# Patient Record
Sex: MALE | Race: WHITE | NOT HISPANIC OR LATINO | Employment: FULL TIME | URBAN - METROPOLITAN AREA
[De-identification: names, ages, dates, MRNs, and addresses within clinical notes are randomized per-mention and may not be internally consistent; named-entity substitution may affect disease eponyms.]

---

## 2024-01-16 ENCOUNTER — OFFICE VISIT (OUTPATIENT)
Dept: GASTROENTEROLOGY | Facility: CLINIC | Age: 59
End: 2024-01-16
Payer: COMMERCIAL

## 2024-01-16 VITALS — HEART RATE: 61 BPM | HEIGHT: 68 IN | WEIGHT: 237 LBS | BODY MASS INDEX: 35.92 KG/M2

## 2024-01-16 DIAGNOSIS — K21.9 GASTROESOPHAGEAL REFLUX DISEASE, UNSPECIFIED WHETHER ESOPHAGITIS PRESENT: Primary | ICD-10-CM

## 2024-01-16 DIAGNOSIS — K30 FUNCTIONAL DYSPEPSIA: ICD-10-CM

## 2024-01-16 DIAGNOSIS — R14.0 BLOATING: ICD-10-CM

## 2024-01-16 PROCEDURE — 99203 OFFICE O/P NEW LOW 30 MIN: CPT | Performed by: INTERNAL MEDICINE

## 2024-01-16 PROCEDURE — 1036F TOBACCO NON-USER: CPT | Performed by: INTERNAL MEDICINE

## 2024-01-16 NOTE — PROGRESS NOTES
REASON FOR VISIT: Discuss abdominal symptoms    HPI:  Chandu Allred is a 58 y.o. male with a past medical history of GERD being evaluated in the office for dyspepsia and bloating symptoms.  States over the past 6 months has had more fullness in the upper abdomen postprandially.  Feels full quickly.  Has history of GERD in the past and told on upper endoscopy more than 20 years ago that he had reflux damage and ulcers.  Was on PPI therapy chronically for some time back then but has not been on it more recently at all.  He has gained 30 pounds in the past 3 years gradually.  No rectal bleeding or melena.  No dysphagia or odynophagia.          PRIOR ENDOSCOPY    PAST MEDICAL HISTORY  Past Medical History:   Diagnosis Date    Other conditions influencing health status     Peptic Ulcer    Personal history of diseases of the skin and subcutaneous tissue 06/15/2018    History of skin pruritus    Personal history of diseases of the skin and subcutaneous tissue 06/15/2018    History of dermatitis    Personal history of other diseases of the nervous system and sense organs 04/25/2019    History of ear pain    Personal history of other diseases of the respiratory system 07/25/2015    History of acute pharyngitis    Personal history of other diseases of the respiratory system 11/06/2013    History of acute bronchitis    Personal history of other diseases of the respiratory system 08/28/2013    History of acute sinusitis       PAST SURGICAL HISTORY  No past surgical history on file.    FAMILY HISTORY  No family history on file.    SOCIAL HISTORY  Social History     Tobacco Use    Smoking status: Former     Types: Cigarettes     Passive exposure: Past    Smokeless tobacco: Never   Substance Use Topics    Alcohol use: Not on file       REVIEW OF SYSTEMS  CONSTITUTIONAL: negative for fever, chills, fatigue, or unintentional weight loss,   HEENT: negative for icteric sclera, eye pain/redness, or changes in  "vision/hearing  RESPIRATORY: negative for cough, hemoptysis, wheezing, orthopnea, or dyspnea on exertion  CARDIOVASCULAR: negative for chest pain, palpitations, or syncope   GASTROINTESTINAL: as noted per HPI  GENITOURINARY: negative for dysuria, polyuria, incontinence, or hematuria  MUSCULOSKELETAL: negative for arthralgia, myalgia, or joint swelling/stiffness   INTEGUMENTARY/SKIN: negative jaundice, rash, or skin lesion  HEMATOLOGIC/LYMPHATIC: negative for prolonged bleeding, easy bruising, or swollen lymph nodes  ENDOCRINE: negative for cold/heat intolerance, polydipsia, polyuria, or goiter  NEUROLOGIC: negative for headaches, dizziness, tremor, or gait abnormality  PSYCHIATRIC: negative for anxiety, depression, personality changes, or sleep disturbances      A 10 point review of systems was completed and was otherwise negative.    ALLERGIES  No Known Allergies    MEDICATIONS  No current outpatient medications on file.     No current facility-administered medications for this visit.       VITALS  Pulse 61   Ht 1.715 m (5' 7.5\")   Wt 108 kg (237 lb)   BMI 36.57 kg/m²      PHYSICAL EXAM  Alert and oriented in no acute distress  Abdomen soft, no focal tenderness to palpation, no hepatosplenomegaly    ASSESSMENT/ PLAN  Patient with dyspepsia and bloating.  History of GERD and esophagitis in the past.  Also possible peptic ulcers in the past.  Given his ongoing symptoms recommended upper endoscopy to rule out underlying reflux esophagitis and screen for Lin's esophagus and also rule out underlying upper GI neoplasia.  Not losing weight unintentionally at this point.  Discussed possibility of functional dyspepsia as well.  He is due for screening colonoscopy and has had Cologuard test approximately 4 years ago.  He would like to pursue at the same time which we will arrange.        Signature: Isa Solitraio MD    Date: 1/16/2024  Time: 3:52 PM    "

## 2024-01-17 DIAGNOSIS — K21.9 GASTROESOPHAGEAL REFLUX DISEASE, UNSPECIFIED WHETHER ESOPHAGITIS PRESENT: ICD-10-CM

## 2024-01-17 DIAGNOSIS — K30 FUNCTIONAL DYSPEPSIA: ICD-10-CM

## 2024-01-17 RX ORDER — HYDROGEN PEROXIDE 3 %
20 SOLUTION, NON-ORAL MISCELLANEOUS
Qty: 90 CAPSULE | Refills: 1 | Status: SHIPPED | OUTPATIENT
Start: 2024-01-17 | End: 2024-02-06 | Stop reason: WASHOUT

## 2024-01-18 DIAGNOSIS — K21.9 GASTROESOPHAGEAL REFLUX DISEASE, UNSPECIFIED WHETHER ESOPHAGITIS PRESENT: Primary | ICD-10-CM

## 2024-01-18 RX ORDER — OMEPRAZOLE 20 MG/1
20 TABLET, DELAYED RELEASE ORAL DAILY
Qty: 90 TABLET | Refills: 1 | Status: SHIPPED | OUTPATIENT
Start: 2024-01-18 | End: 2024-02-06 | Stop reason: WASHOUT

## 2024-01-19 DIAGNOSIS — K30 FUNCTIONAL DYSPEPSIA: ICD-10-CM

## 2024-01-19 DIAGNOSIS — K21.9 GASTROESOPHAGEAL REFLUX DISEASE, UNSPECIFIED WHETHER ESOPHAGITIS PRESENT: ICD-10-CM

## 2024-01-19 RX ORDER — ESOMEPRAZOLE MAGNESIUM 40 MG/1
40 CAPSULE, DELAYED RELEASE ORAL
Qty: 90 CAPSULE | Refills: 1 | Status: SHIPPED | OUTPATIENT
Start: 2024-01-19

## 2024-02-06 ENCOUNTER — LAB (OUTPATIENT)
Dept: LAB | Facility: LAB | Age: 59
End: 2024-02-06
Payer: COMMERCIAL

## 2024-02-06 ENCOUNTER — OFFICE VISIT (OUTPATIENT)
Dept: PRIMARY CARE | Facility: CLINIC | Age: 59
End: 2024-02-06
Payer: COMMERCIAL

## 2024-02-06 VITALS
DIASTOLIC BLOOD PRESSURE: 65 MMHG | OXYGEN SATURATION: 95 % | HEART RATE: 56 BPM | HEIGHT: 69 IN | WEIGHT: 233 LBS | SYSTOLIC BLOOD PRESSURE: 141 MMHG | TEMPERATURE: 99.2 F | BODY MASS INDEX: 34.51 KG/M2

## 2024-02-06 DIAGNOSIS — K21.9 GASTROESOPHAGEAL REFLUX DISEASE WITHOUT ESOPHAGITIS: ICD-10-CM

## 2024-02-06 DIAGNOSIS — Z00.00 ROUTINE GENERAL MEDICAL EXAMINATION AT A HEALTH CARE FACILITY: ICD-10-CM

## 2024-02-06 DIAGNOSIS — Z00.00 ROUTINE GENERAL MEDICAL EXAMINATION AT A HEALTH CARE FACILITY: Primary | ICD-10-CM

## 2024-02-06 DIAGNOSIS — F10.20 UNCOMPLICATED ALCOHOL DEPENDENCE (MULTI): ICD-10-CM

## 2024-02-06 DIAGNOSIS — I25.10 ATHEROSCLEROSIS OF NATIVE CORONARY ARTERY OF NATIVE HEART WITHOUT ANGINA PECTORIS: ICD-10-CM

## 2024-02-06 DIAGNOSIS — R14.0 ABDOMINAL BLOATING: ICD-10-CM

## 2024-02-06 LAB — PSA SERPL-MCNC: 0.46 NG/ML

## 2024-02-06 PROCEDURE — 80053 COMPREHEN METABOLIC PANEL: CPT

## 2024-02-06 PROCEDURE — 80061 LIPID PANEL: CPT

## 2024-02-06 PROCEDURE — 36415 COLL VENOUS BLD VENIPUNCTURE: CPT

## 2024-02-06 PROCEDURE — 99396 PREV VISIT EST AGE 40-64: CPT | Performed by: INTERNAL MEDICINE

## 2024-02-06 PROCEDURE — 93000 ELECTROCARDIOGRAM COMPLETE: CPT | Performed by: INTERNAL MEDICINE

## 2024-02-06 PROCEDURE — 82977 ASSAY OF GGT: CPT

## 2024-02-06 PROCEDURE — 84153 ASSAY OF PSA TOTAL: CPT

## 2024-02-06 PROCEDURE — 81003 URINALYSIS AUTO W/O SCOPE: CPT

## 2024-02-06 PROCEDURE — 1036F TOBACCO NON-USER: CPT | Performed by: INTERNAL MEDICINE

## 2024-02-06 PROCEDURE — 85025 COMPLETE CBC W/AUTO DIFF WBC: CPT

## 2024-02-06 RX ORDER — LANOLIN ALCOHOL/MO/W.PET/CERES
100 CREAM (GRAM) TOPICAL DAILY
Qty: 30 TABLET | Refills: 11 | Status: SHIPPED | OUTPATIENT
Start: 2024-02-06 | End: 2025-02-05

## 2024-02-06 ASSESSMENT — ENCOUNTER SYMPTOMS
EYES NEGATIVE: 1
HEMATOLOGIC/LYMPHATIC NEGATIVE: 1
NEUROLOGICAL NEGATIVE: 1
GASTROINTESTINAL NEGATIVE: 1
PSYCHIATRIC NEGATIVE: 1
ALLERGIC/IMMUNOLOGIC NEGATIVE: 1
CARDIOVASCULAR NEGATIVE: 1
CONSTITUTIONAL NEGATIVE: 1
MUSCULOSKELETAL NEGATIVE: 1
RESPIRATORY NEGATIVE: 1
ENDOCRINE NEGATIVE: 1

## 2024-02-06 ASSESSMENT — PAIN SCALES - GENERAL: PAINLEVEL: 0-NO PAIN

## 2024-02-06 ASSESSMENT — PATIENT HEALTH QUESTIONNAIRE - PHQ9
2. FEELING DOWN, DEPRESSED OR HOPELESS: NOT AT ALL
SUM OF ALL RESPONSES TO PHQ9 QUESTIONS 1 AND 2: 0
1. LITTLE INTEREST OR PLEASURE IN DOING THINGS: NOT AT ALL

## 2024-02-06 ASSESSMENT — LIFESTYLE VARIABLES
HOW OFTEN DO YOU HAVE SIX OR MORE DRINKS ON ONE OCCASION: LESS THAN MONTHLY
HOW OFTEN DO YOU HAVE A DRINK CONTAINING ALCOHOL: 2-4 TIMES A MONTH
SKIP TO QUESTIONS 9-10: 0
AUDIT-C TOTAL SCORE: 3
HOW MANY STANDARD DRINKS CONTAINING ALCOHOL DO YOU HAVE ON A TYPICAL DAY: 1 OR 2

## 2024-02-06 ASSESSMENT — COLUMBIA-SUICIDE SEVERITY RATING SCALE - C-SSRS: 1. IN THE PAST MONTH, HAVE YOU WISHED YOU WERE DEAD OR WISHED YOU COULD GO TO SLEEP AND NOT WAKE UP?: NO

## 2024-02-06 NOTE — PROGRESS NOTES
"Subjective   Patient ID: Chandu Allred is a 58 y.o. male who presents for New Patient Visit.    HPI patient presents to clinic in order to get established with the office.  He had been a former patient of Dr. Yajaira Sanderson   He has been complaining of abdominal bloating going on for the past 1 year.  He is also requesting a routine physical examination.  He denies any nausea, vomiting, GI bleeding, diarrhea, jaundice, chest pain and shortness of breath.  He is also here for routine physical examination.  EKG done in the office shows sinus bradycardia at 56 bpm with poor baseline, normal axis normal interval without any ischemic changes.  Past medical history significant for alcohol dependence, GERD, peptic ulcer disease, coronary atherosclerosis, coronary artery calcium score of 48.1 in 2019 and obesity.  Past surgical history negative for any recent surgery.    Review of Systems   Constitutional: Negative.    HENT: Negative.     Eyes: Negative.    Respiratory: Negative.     Cardiovascular: Negative.    Gastrointestinal: Negative.         With abdominal bloating   Endocrine: Negative.    Genitourinary: Negative.    Musculoskeletal: Negative.    Skin: Negative.    Allergic/Immunologic: Negative.    Neurological: Negative.    Hematological: Negative.    Psychiatric/Behavioral: Negative.         Objective   /65   Pulse 56   Temp 37.3 °C (99.2 °F)   Ht 1.753 m (5' 9\")   Wt 106 kg (233 lb)   SpO2 95%   BMI 34.41 kg/m²     Physical Exam  Constitutional:       Appearance: Normal appearance. He is obese.   HENT:      Right Ear: Tympanic membrane normal.      Left Ear: Tympanic membrane and ear canal normal.      Nose: Nose normal.   Neck:      Vascular: No carotid bruit.   Cardiovascular:      Rate and Rhythm: Normal rate.   Pulmonary:      Effort: No respiratory distress.      Breath sounds: No stridor. No wheezing.   Abdominal:      Palpations: Abdomen is soft.      Tenderness: There is no guarding or rebound.     "  Comments: With hepatomegaly   Skin:     Coloration: Skin is not jaundiced.   Neurological:      Mental Status: He is alert.   Psychiatric:         Mood and Affect: Mood normal.         Assessment/Plan    patient will be scheduled for routine blood work.  He is also advised to obtain an ultrasound of the abdomen in view of his symptoms of abdominal bloating.  He is already scheduled for EGD and colonoscopy with Dr. Iraheta this month.  He is encouraged to cut down on drinking alcohol.  He will be started on thiamine 100 mg daily.  He will return to clinic in 2 weeks for follow-up visit.

## 2024-02-06 NOTE — PROGRESS NOTES
"Subjective   Patient ID: Chandu Allred is a 58 y.o. male who presents for New Patient Visit.    HPI     Review of Systems    Objective   /65   Pulse 56   Temp 37.3 °C (99.2 °F)   Ht 1.753 m (5' 9\")   Wt 106 kg (233 lb)   SpO2 95%   BMI 34.41 kg/m²     Physical Exam    Assessment/Plan          "

## 2024-02-07 LAB
ALBUMIN SERPL BCP-MCNC: 4.6 G/DL (ref 3.4–5)
ALP SERPL-CCNC: 50 U/L (ref 33–120)
ALT SERPL W P-5'-P-CCNC: 61 U/L (ref 10–52)
ANION GAP SERPL CALC-SCNC: 12 MMOL/L (ref 10–20)
APPEARANCE UR: CLEAR
AST SERPL W P-5'-P-CCNC: 32 U/L (ref 9–39)
BASOPHILS # BLD AUTO: 0.06 X10*3/UL (ref 0–0.1)
BASOPHILS NFR BLD AUTO: 0.9 %
BILIRUB SERPL-MCNC: 0.5 MG/DL (ref 0–1.2)
BILIRUB UR STRIP.AUTO-MCNC: NEGATIVE MG/DL
BUN SERPL-MCNC: 15 MG/DL (ref 6–23)
CALCIUM SERPL-MCNC: 9.5 MG/DL (ref 8.6–10.6)
CHLORIDE SERPL-SCNC: 103 MMOL/L (ref 98–107)
CHOLEST SERPL-MCNC: 225 MG/DL (ref 0–199)
CHOLESTEROL/HDL RATIO: 3.6
CO2 SERPL-SCNC: 30 MMOL/L (ref 21–32)
COLOR UR: NORMAL
CREAT SERPL-MCNC: 0.91 MG/DL (ref 0.5–1.3)
EGFRCR SERPLBLD CKD-EPI 2021: >90 ML/MIN/1.73M*2
EOSINOPHIL # BLD AUTO: 0.21 X10*3/UL (ref 0–0.7)
EOSINOPHIL NFR BLD AUTO: 3.3 %
ERYTHROCYTE [DISTWIDTH] IN BLOOD BY AUTOMATED COUNT: 13.1 % (ref 11.5–14.5)
GGT SERPL-CCNC: 100 U/L (ref 5–64)
GLUCOSE SERPL-MCNC: 106 MG/DL (ref 74–99)
GLUCOSE UR STRIP.AUTO-MCNC: NORMAL MG/DL
HCT VFR BLD AUTO: 43.5 % (ref 41–52)
HDLC SERPL-MCNC: 61.9 MG/DL
HGB BLD-MCNC: 14.3 G/DL (ref 13.5–17.5)
IMM GRANULOCYTES # BLD AUTO: 0.02 X10*3/UL (ref 0–0.7)
IMM GRANULOCYTES NFR BLD AUTO: 0.3 % (ref 0–0.9)
KETONES UR STRIP.AUTO-MCNC: NEGATIVE MG/DL
LDLC SERPL CALC-MCNC: 135 MG/DL
LEUKOCYTE ESTERASE UR QL STRIP.AUTO: NEGATIVE
LYMPHOCYTES # BLD AUTO: 1.63 X10*3/UL (ref 1.2–4.8)
LYMPHOCYTES NFR BLD AUTO: 25.3 %
MCH RBC QN AUTO: 29.4 PG (ref 26–34)
MCHC RBC AUTO-ENTMCNC: 32.9 G/DL (ref 32–36)
MCV RBC AUTO: 90 FL (ref 80–100)
MONOCYTES # BLD AUTO: 0.52 X10*3/UL (ref 0.1–1)
MONOCYTES NFR BLD AUTO: 8.1 %
NEUTROPHILS # BLD AUTO: 4.01 X10*3/UL (ref 1.2–7.7)
NEUTROPHILS NFR BLD AUTO: 62.1 %
NITRITE UR QL STRIP.AUTO: NEGATIVE
NON HDL CHOLESTEROL: 163 MG/DL (ref 0–149)
NRBC BLD-RTO: 0 /100 WBCS (ref 0–0)
PH UR STRIP.AUTO: 6.5 [PH]
PLATELET # BLD AUTO: 254 X10*3/UL (ref 150–450)
POTASSIUM SERPL-SCNC: 4.5 MMOL/L (ref 3.5–5.3)
PROT SERPL-MCNC: 7 G/DL (ref 6.4–8.2)
PROT UR STRIP.AUTO-MCNC: NEGATIVE MG/DL
RBC # BLD AUTO: 4.86 X10*6/UL (ref 4.5–5.9)
RBC # UR STRIP.AUTO: NEGATIVE /UL
SODIUM SERPL-SCNC: 140 MMOL/L (ref 136–145)
SP GR UR STRIP.AUTO: 1.02
TRIGL SERPL-MCNC: 142 MG/DL (ref 0–149)
UROBILINOGEN UR STRIP.AUTO-MCNC: NORMAL MG/DL
VLDL: 28 MG/DL (ref 0–40)
WBC # BLD AUTO: 6.5 X10*3/UL (ref 4.4–11.3)

## 2024-02-12 ENCOUNTER — APPOINTMENT (OUTPATIENT)
Dept: PRIMARY CARE | Facility: CLINIC | Age: 59
End: 2024-02-12
Payer: COMMERCIAL

## 2024-02-23 ENCOUNTER — OFFICE VISIT (OUTPATIENT)
Dept: GASTROENTEROLOGY | Facility: EXTERNAL LOCATION | Age: 59
End: 2024-02-23
Payer: COMMERCIAL

## 2024-02-23 DIAGNOSIS — Z12.11 ENCOUNTER FOR SCREENING FOR MALIGNANT NEOPLASM OF COLON: ICD-10-CM

## 2024-02-23 DIAGNOSIS — K64.8 INTERNAL HEMORRHOIDS: Primary | ICD-10-CM

## 2024-02-23 DIAGNOSIS — K31.89 OTHER DISEASES OF STOMACH AND DUODENUM: ICD-10-CM

## 2024-02-23 DIAGNOSIS — K21.9 GERD (GASTROESOPHAGEAL REFLUX DISEASE): ICD-10-CM

## 2024-02-23 DIAGNOSIS — K21.9 GASTROESOPHAGEAL REFLUX DISEASE WITHOUT ESOPHAGITIS: ICD-10-CM

## 2024-02-23 DIAGNOSIS — R10.13 EPIGASTRIC PAIN: ICD-10-CM

## 2024-02-23 PROCEDURE — 88305 TISSUE EXAM BY PATHOLOGIST: CPT | Performed by: PATHOLOGY

## 2024-02-23 PROCEDURE — 43239 EGD BIOPSY SINGLE/MULTIPLE: CPT | Performed by: INTERNAL MEDICINE

## 2024-02-23 PROCEDURE — 88305 TISSUE EXAM BY PATHOLOGIST: CPT

## 2024-02-23 PROCEDURE — G0121 COLON CA SCRN NOT HI RSK IND: HCPCS | Performed by: INTERNAL MEDICINE

## 2024-02-23 PROCEDURE — 1036F TOBACCO NON-USER: CPT | Performed by: INTERNAL MEDICINE

## 2024-02-27 ENCOUNTER — HOSPITAL ENCOUNTER (OUTPATIENT)
Dept: RADIOLOGY | Facility: CLINIC | Age: 59
Discharge: HOME | End: 2024-02-27
Payer: COMMERCIAL

## 2024-02-27 ENCOUNTER — LAB REQUISITION (OUTPATIENT)
Dept: LAB | Facility: HOSPITAL | Age: 59
End: 2024-02-27
Payer: COMMERCIAL

## 2024-02-27 DIAGNOSIS — R14.0 ABDOMINAL BLOATING: ICD-10-CM

## 2024-02-27 PROCEDURE — 76700 US EXAM ABDOM COMPLETE: CPT | Performed by: RADIOLOGY

## 2024-02-27 PROCEDURE — 76700 US EXAM ABDOM COMPLETE: CPT

## 2024-03-01 LAB
LABORATORY COMMENT REPORT: NORMAL
PATH REPORT.FINAL DX SPEC: NORMAL
PATH REPORT.GROSS SPEC: NORMAL
PATH REPORT.RELEVANT HX SPEC: NORMAL
PATH REPORT.TOTAL CANCER: NORMAL

## 2024-03-04 DIAGNOSIS — K58.0 IRRITABLE BOWEL SYNDROME WITH DIARRHEA: Primary | ICD-10-CM

## 2024-03-04 NOTE — PROGRESS NOTES
Discussed with patient.  Given significant symptoms including bloating consistent with IBS will place on trial of Xifaxan.  He is in agreement.

## 2024-03-06 ENCOUNTER — OFFICE VISIT (OUTPATIENT)
Dept: PRIMARY CARE | Facility: CLINIC | Age: 59
End: 2024-03-06
Payer: COMMERCIAL

## 2024-03-06 VITALS
WEIGHT: 230 LBS | HEART RATE: 50 BPM | DIASTOLIC BLOOD PRESSURE: 86 MMHG | OXYGEN SATURATION: 95 % | SYSTOLIC BLOOD PRESSURE: 134 MMHG | BODY MASS INDEX: 34.07 KG/M2 | HEIGHT: 69 IN

## 2024-03-06 DIAGNOSIS — F10.20 UNCOMPLICATED ALCOHOL DEPENDENCE (MULTI): ICD-10-CM

## 2024-03-06 DIAGNOSIS — R14.0 ABDOMINAL BLOATING: ICD-10-CM

## 2024-03-06 DIAGNOSIS — R79.89 ABNORMAL LFTS: ICD-10-CM

## 2024-03-06 DIAGNOSIS — K76.89 HEPATIC CYST: Primary | ICD-10-CM

## 2024-03-06 DIAGNOSIS — E78.5 DYSLIPIDEMIA: ICD-10-CM

## 2024-03-06 DIAGNOSIS — Z00.00 ROUTINE GENERAL MEDICAL EXAMINATION AT A HEALTH CARE FACILITY: ICD-10-CM

## 2024-03-06 DIAGNOSIS — R73.9 HYPERGLYCEMIA: ICD-10-CM

## 2024-03-06 PROCEDURE — 1036F TOBACCO NON-USER: CPT | Performed by: INTERNAL MEDICINE

## 2024-03-06 PROCEDURE — 99214 OFFICE O/P EST MOD 30 MIN: CPT | Performed by: INTERNAL MEDICINE

## 2024-03-06 RX ORDER — EZETIMIBE 10 MG/1
10 TABLET ORAL DAILY
Qty: 90 TABLET | Refills: 1 | Status: SHIPPED | OUTPATIENT
Start: 2024-03-06 | End: 2025-03-06

## 2024-03-06 ASSESSMENT — ENCOUNTER SYMPTOMS
FLATUS: 1
HEMATURIA: 0
FREQUENCY: 0
FEVER: 0
HEADACHES: 0
WEIGHT LOSS: 0
HEMATOCHEZIA: 0
BELCHING: 1
ARTHRALGIAS: 0
DYSURIA: 0
CONSTIPATION: 0
DIARRHEA: 0
NAUSEA: 0
ABDOMINAL PAIN: 1
ANOREXIA: 0
MYALGIAS: 0
VOMITING: 0

## 2024-03-06 ASSESSMENT — PAIN SCALES - GENERAL: PAINLEVEL: 0-NO PAIN

## 2024-03-06 ASSESSMENT — COLUMBIA-SUICIDE SEVERITY RATING SCALE - C-SSRS: 1. IN THE PAST MONTH, HAVE YOU WISHED YOU WERE DEAD OR WISHED YOU COULD GO TO SLEEP AND NOT WAKE UP?: NO

## 2024-03-06 NOTE — PROGRESS NOTES
"Subjective   Patient ID: Chandu Allred is a 58 y.o. male who presents for Follow-up.    HPI     Review of Systems    Objective   BP (!) 150/92   Pulse 50   Ht 1.753 m (5' 9\")   Wt 104 kg (230 lb)   SpO2 95%   BMI 33.97 kg/m²     Physical Exam    Assessment/Plan          "

## 2024-03-06 NOTE — PROGRESS NOTES
Subjective   Patient ID: Chandu Allred is a 58 y.o. male who presents for Follow-up.    Abdominal Pain  This is a recurrent problem. The current episode started more than 1 month ago. The onset quality is undetermined. The problem occurs intermittently. The most recent episode lasted 3 hours. The problem has been waxing and waning. The pain is located in the epigastric region. The pain is at a severity of 4/10. The quality of the pain is a sensation of fullness. The abdominal pain radiates to the epigastric region. Associated symptoms include belching and flatus. Pertinent negatives include no anorexia, arthralgias, constipation, diarrhea, dysuria, fever, frequency, headaches, hematochezia, hematuria, melena, myalgias, nausea, vomiting or weight loss. The pain is aggravated by belching. The pain is relieved by Belching. Prior diagnostic workup includes GI consult, lower endoscopy, ultrasound and upper endoscopy.    patient presents to clinic for follow-up visit on history of  alcohol dependence, GERD, peptic ulcer disease, coronary atherosclerosis, and obesity.  He underwent colonoscopy on 2/23/2024 which revealed normal mucosa without any polyp or mass lesion and also had EGD which revealed nodular density at GE junction and erythematous mucosa in the antrum and biopsy showed acute duodenitis, negative for H. pylori organism and intestinal metaplasia and this plasia.  Laboratory studies done revealed cholesterol of 225 and LDL of 135, GGT of 100 ALT of 61 glucose of 106 and other studies were unremarkable.  Ultrasound of the liver done revealed cystic versus nodular lesion and hepatic steatosis without any fluid collection.       Review of Systems   Constitutional: Negative.  Negative for fever and weight loss.   HENT: Negative.     Eyes: Negative.    Respiratory: Negative.     Cardiovascular: Negative.    Gastrointestinal:  Positive for abdominal pain and flatus. Negative for anorexia, constipation, diarrhea,  "hematochezia, melena, nausea and vomiting.   Endocrine: Negative.    Genitourinary: Negative.  Negative for dysuria, frequency and hematuria.   Musculoskeletal: Negative.  Negative for arthralgias and myalgias.   Skin: Negative.    Allergic/Immunologic: Negative.    Neurological: Negative.  Negative for headaches.   Hematological: Negative.    Psychiatric/Behavioral: Negative.         Objective   /86   Pulse 50   Ht 1.753 m (5' 9\")   Wt 104 kg (230 lb)   SpO2 95%   BMI 33.97 kg/m²     Physical Exam  Constitutional:       Appearance: Normal appearance. He is obese.   HENT:      Right Ear: Tympanic membrane normal.      Left Ear: Tympanic membrane and ear canal normal.      Nose: Nose normal.   Neck:      Vascular: No carotid bruit.   Cardiovascular:      Rate and Rhythm: Normal rate.   Pulmonary:      Effort: No respiratory distress.      Breath sounds: No stridor. No wheezing.   Abdominal:      Palpations: Abdomen is soft.      Tenderness: There is no abdominal tenderness. There is no guarding or rebound.   Skin:     Coloration: Skin is not jaundiced.      Findings: No bruising.   Neurological:      General: No focal deficit present.      Mental Status: He is alert and oriented to person, place, and time.   Psychiatric:         Mood and Affect: Mood normal.         Assessment/Plan    patient will be scheduled for MRI of the liver regarding hepatic cyst.  Started on Zetia 10 mg daily for dyslipidemia.  He is encouraged to cut down on drinking alcohol.  He will continue other medications and will return to clinic in 6 months for follow-up visit.       "

## 2024-03-10 ASSESSMENT — ENCOUNTER SYMPTOMS
ANOREXIA: 0
DYSURIA: 0
MUSCULOSKELETAL NEGATIVE: 1
FREQUENCY: 0
HEADACHES: 0
HEMATOLOGIC/LYMPHATIC NEGATIVE: 1
BELCHING: 1
HEMATURIA: 0
WEIGHT LOSS: 0
EYES NEGATIVE: 1
NAUSEA: 0
RESPIRATORY NEGATIVE: 1
NEUROLOGICAL NEGATIVE: 1
ABDOMINAL PAIN: 1
CARDIOVASCULAR NEGATIVE: 1
ARTHRALGIAS: 0
PSYCHIATRIC NEGATIVE: 1
VOMITING: 0
FLATUS: 1
HEMATOCHEZIA: 0
CONSTITUTIONAL NEGATIVE: 1
ENDOCRINE NEGATIVE: 1
ALLERGIC/IMMUNOLOGIC NEGATIVE: 1
CONSTIPATION: 0
FEVER: 0
DIARRHEA: 0
MYALGIAS: 0

## 2024-03-11 ENCOUNTER — APPOINTMENT (OUTPATIENT)
Dept: RADIOLOGY | Facility: HOSPITAL | Age: 59
End: 2024-03-11
Payer: COMMERCIAL

## 2024-04-12 ENCOUNTER — APPOINTMENT (OUTPATIENT)
Dept: RADIOLOGY | Facility: HOSPITAL | Age: 59
End: 2024-04-12
Payer: COMMERCIAL

## 2024-06-14 ENCOUNTER — APPOINTMENT (OUTPATIENT)
Dept: PRIMARY CARE | Facility: CLINIC | Age: 59
End: 2024-06-14
Payer: COMMERCIAL

## 2024-11-28 DIAGNOSIS — K21.9 GASTROESOPHAGEAL REFLUX DISEASE, UNSPECIFIED WHETHER ESOPHAGITIS PRESENT: ICD-10-CM

## 2024-11-28 DIAGNOSIS — K30 FUNCTIONAL DYSPEPSIA: ICD-10-CM

## 2024-12-02 RX ORDER — ESOMEPRAZOLE MAGNESIUM 40 MG/1
40 CAPSULE, DELAYED RELEASE ORAL
Qty: 90 CAPSULE | Refills: 0 | Status: SHIPPED | OUTPATIENT
Start: 2024-12-02

## 2025-01-31 ENCOUNTER — TELEPHONE (OUTPATIENT)
Dept: GASTROENTEROLOGY | Facility: CLINIC | Age: 60
End: 2025-01-31
Payer: COMMERCIAL

## 2025-01-31 NOTE — TELEPHONE ENCOUNTER
So he wants you to know that he still has this feeling of being full.  He has almost completely cut out alcohol. (Only had a couple of drinks since November) But says something is still not right.  He would like some kind of scan?

## 2025-02-25 ENCOUNTER — APPOINTMENT (OUTPATIENT)
Dept: GASTROENTEROLOGY | Facility: CLINIC | Age: 60
End: 2025-02-25
Payer: COMMERCIAL

## 2025-02-25 VITALS — BODY MASS INDEX: 34.96 KG/M2 | HEART RATE: 55 BPM | HEIGHT: 69 IN | WEIGHT: 236 LBS

## 2025-02-25 DIAGNOSIS — K76.0 FATTY LIVER: ICD-10-CM

## 2025-02-25 DIAGNOSIS — R14.0 BLOATING: Primary | ICD-10-CM

## 2025-02-25 DIAGNOSIS — K30 FUNCTIONAL DYSPEPSIA: ICD-10-CM

## 2025-02-25 PROCEDURE — 3008F BODY MASS INDEX DOCD: CPT | Performed by: INTERNAL MEDICINE

## 2025-02-25 PROCEDURE — 1036F TOBACCO NON-USER: CPT | Performed by: INTERNAL MEDICINE

## 2025-02-25 PROCEDURE — 99213 OFFICE O/P EST LOW 20 MIN: CPT | Performed by: INTERNAL MEDICINE

## 2025-02-25 NOTE — PROGRESS NOTES
REASON FOR VISIT: Follow-up abdominal symptoms    HPI:  Chandu Allred is a 59 y.o. male here for follow-up on abdominal bloating and dyspepsia symptoms.  Underwent upper endoscopy and colonoscopy 1 year ago.  Mild nodular changes at GE junction consistent with reactive changes likely secondary to GERD.  Uses PPI therapy daily which helps control any heartburn symptoms.  Continues to have fullness and bloating.  No unintentional weight loss.  No nausea or vomiting.  No diarrhea or constipation symptoms and no rectal bleeding or melena.  On ultrasound had shown increased echogenicity most consistent with fatty liver disease.  He has cut down alcohol significantly and not drinking on any regular basis at this point.  When he drank alcohol he would experience some left upper quadrant discomfort which could last a day.          PRIOR ENDOSCOPY    PAST MEDICAL HISTORY  Past Medical History:   Diagnosis Date    Other conditions influencing health status     Peptic Ulcer    Peptic ulceration 01/01/2020    Personal history of diseases of the skin and subcutaneous tissue 06/15/2018    History of skin pruritus    Personal history of diseases of the skin and subcutaneous tissue 06/15/2018    History of dermatitis    Personal history of other diseases of the nervous system and sense organs 04/25/2019    History of ear pain    Personal history of other diseases of the respiratory system 07/25/2015    History of acute pharyngitis    Personal history of other diseases of the respiratory system 11/06/2013    History of acute bronchitis    Personal history of other diseases of the respiratory system 08/28/2013    History of acute sinusitis    Urinary tract infection 2008       PAST SURGICAL HISTORY  No past surgical history on file.    FAMILY HISTORY  Family History   Problem Relation Name Age of Onset    Atrial fibrillation Mother      Valvular heart disease Mother      Lung cancer Father         SOCIAL HISTORY  Social History      Tobacco Use    Smoking status: Former     Current packs/day: 0.00     Average packs/day: 1.5 packs/day for 36.7 years (55.1 ttl pk-yrs)     Types: Cigarettes     Start date: 4/10/1983     Quit date: 2020     Years since quittin.1     Passive exposure: Past    Smokeless tobacco: Never   Substance Use Topics    Alcohol use: Yes     Alcohol/week: 6.0 standard drinks of alcohol     Types: 6 Glasses of wine per week     Comment: 1-2 glasses a night. None on Sundays.       REVIEW OF SYSTEMS  CONSTITUTIONAL: negative for fever, chills, fatigue, or unintentional weight loss,   HEENT: negative for icteric sclera, eye pain/redness, or changes in vision/hearing  RESPIRATORY: negative for cough, hemoptysis, wheezing, orthopnea, or dyspnea on exertion  CARDIOVASCULAR: negative for chest pain, palpitations, or syncope   GASTROINTESTINAL: as noted per HPI  GENITOURINARY: negative for dysuria, polyuria, incontinence, or hematuria  MUSCULOSKELETAL: negative for arthralgia, myalgia, or joint swelling/stiffness   INTEGUMENTARY/SKIN: negative jaundice, rash, or skin lesion  HEMATOLOGIC/LYMPHATIC: negative for prolonged bleeding, easy bruising, or swollen lymph nodes  ENDOCRINE: negative for cold/heat intolerance, polydipsia, polyuria, or goiter  NEUROLOGIC: negative for headaches, dizziness, tremor, or gait abnormality  PSYCHIATRIC: negative for anxiety, depression, personality changes, or sleep disturbances      A 10 point review of systems was completed and was otherwise negative.    ALLERGIES  No Known Allergies    MEDICATIONS  Current Outpatient Medications   Medication Sig Dispense Refill    esomeprazole (NexIUM) 40 mg DR capsule Take 1 capsule (40 mg) by mouth once daily in the morning. Take before meals. **DUE TO SEE DOCTOR IN MARCH BEFORE NEXT REFILL** 90 capsule 0    ezetimibe (Zetia) 10 mg tablet Take 1 tablet (10 mg) by mouth once daily. 90 tablet 1     No current facility-administered medications for this visit.  "      VITALS  Pulse 55   Ht 1.753 m (5' 9\")   Wt 107 kg (236 lb)   BMI 34.85 kg/m²      PHYSICAL EXAM  Alert and oriented in no acute distress    ASSESSMENT/ PLAN  Patient with persistent abdominal bloating and fullness symptoms.  Has changes of increased echogenicity in the liver on ultrasound likely fatty liver disease.  I recommended avoiding alcohol and cardio exercise 4 to 5 days a week.  Also recommended weight loss.  His abdominal fullness and bloating symptoms could be lactose intolerance as he does drink milk daily and eats ice cream frequently.  I recommended going lactose-free for 2 weeks as a trial to see if this improves his symptoms.  If not improved I did suggest a trial of low FODMAP diet over 2 weeks.  We may consider low-dose desipramine.  Will hold off on imaging as no other alarm symptoms including no weight loss at this point.  He is in agreement with the plan.        Signature: Isa Solitario MD    Date: 2/25/2025  Time: 3:52 PM    "

## 2025-03-24 DIAGNOSIS — R10.84 GENERALIZED ABDOMINAL PAIN: Primary | ICD-10-CM

## 2025-03-26 LAB
ALBUMIN SERPL-MCNC: 4.8 G/DL (ref 3.6–5.1)
ALP SERPL-CCNC: 39 U/L (ref 35–144)
ALT SERPL-CCNC: 43 U/L (ref 9–46)
ANION GAP SERPL CALCULATED.4IONS-SCNC: 9 MMOL/L (CALC) (ref 7–17)
AST SERPL-CCNC: 23 U/L (ref 10–35)
BILIRUB SERPL-MCNC: 0.6 MG/DL (ref 0.2–1.2)
BUN SERPL-MCNC: 15 MG/DL (ref 7–25)
CALCIUM SERPL-MCNC: 9.6 MG/DL (ref 8.6–10.3)
CHLORIDE SERPL-SCNC: 99 MMOL/L (ref 98–110)
CO2 SERPL-SCNC: 30 MMOL/L (ref 20–32)
CREAT SERPL-MCNC: 0.86 MG/DL (ref 0.7–1.3)
EGFRCR SERPLBLD CKD-EPI 2021: 100 ML/MIN/1.73M2
GLUCOSE SERPL-MCNC: 94 MG/DL (ref 65–139)
POTASSIUM SERPL-SCNC: 4.5 MMOL/L (ref 3.5–5.3)
PROT SERPL-MCNC: 7.2 G/DL (ref 6.1–8.1)
SODIUM SERPL-SCNC: 138 MMOL/L (ref 135–146)

## 2025-04-03 ENCOUNTER — APPOINTMENT (OUTPATIENT)
Dept: GASTROENTEROLOGY | Facility: CLINIC | Age: 60
End: 2025-04-03
Payer: COMMERCIAL

## 2025-04-04 DIAGNOSIS — K58.0 IRRITABLE BOWEL SYNDROME WITH DIARRHEA: Primary | ICD-10-CM

## 2025-04-04 NOTE — PROGRESS NOTES
Discussed with patient CT scan without any inflammatory findings.  Evidence of fatty liver that he is aware of we discussed.  Left inguinal hernia noted.  He is not having any discomfort in his left inguinal or lower abdominal area.  We discussed therapy for visceral hypersensitivity given his ongoing bloating type symptoms.  Will place him on trial of Xifaxan 3 times daily for 14 days and reassess symptoms after 2 weeks.  He is in agreement with the plan.

## 2025-04-07 DIAGNOSIS — K58.0 IRRITABLE BOWEL SYNDROME WITH DIARRHEA: ICD-10-CM

## 2025-05-05 PROBLEM — E78.5 DYSLIPIDEMIA: Status: ACTIVE | Noted: 2025-05-05

## 2025-05-05 PROBLEM — R14.0 ABDOMINAL BLOATING: Status: ACTIVE | Noted: 2025-05-05

## 2025-05-05 PROBLEM — K21.9 GASTROESOPHAGEAL REFLUX DISEASE WITHOUT ESOPHAGITIS: Status: ACTIVE | Noted: 2025-05-05

## 2025-05-05 PROBLEM — K58.9 IRRITABLE BOWEL SYNDROME: Status: ACTIVE | Noted: 2025-05-05

## 2025-05-05 NOTE — PROGRESS NOTES
Subjective   Patient ID:   Chandu Allred is a 60 y.o. male who presents for Establish Care.  [facility]  New patient here today to establish care with myself.  Last PCP: Dr. Lauren  Last seen: 2024.    IBS/GERD:  Seeing GI.  Had EGD and colonoscopy in Feb 2024.  Had been on Xifaxan, Nexium without significant relief.  Reports he just had a normal abdominal CT.  Having early satiety and bloating.  Increased belching.    HLD:  Had been on Zetia.  DUE for lipid.    Health maintenance:  Smoking: Former smoker.  PSA (50+): Feb 2024. DUE  Labs: Feb 2024. DUE for CBC, lipid, PSA  Colonoscopy (50-75): Feb 2024.  Influenza:  Shingrix (2 doses, 2-6 months apart):    Review of Systems  12 point review of systems negative unless stated above in HPI    Vitals:    05/12/25 0739   BP: 121/78   Pulse: 77   Resp: 16   SpO2: 98%     Physical Exam  General: Alert and oriented, well nourished, no acute distress.  Lungs: Clear to auscultation, non-labored respiration.  Heart: Normal rate, regular rhythm, no murmur, gallop or edema.  GI: Abdomen is distended. No specific areas of tenderness. No masses.  Neurologic: Awake, alert, and oriented X3, CN II-XII intact.  Psychiatric: Cooperative, appropriate mood and affect.    Assessment/Plan   It was nice meeting you!  I have placed a referral to gastroenterology for further management.  I have ordered some labs to be done as soon as you can.  We will call you with the results.  If symptoms persist or worsen despite current plan of care, please contact your healthcare provider for further evaluation.  Patient instructed to contact the office if there are any questions regarding their care or treatment.   [facility] [phone]    Fu after seeing GI for physical  Diagnoses and all orders for this visit:  Abdominal bloating  -     Referral to Gastroenterology; Future  Dyslipidemia  -     Lipid Panel; Future  Irritable bowel syndrome, unspecified type  -     CBC and Auto  Differential; Future  -     Referral to Gastroenterology; Future  Gastroesophageal reflux disease without esophagitis  -     Referral to Gastroenterology; Future  Screening PSA (prostate specific antigen)  -     Prostate Specific Antigen, Screen; Future  BMI 34.0-34.9,adult  Obesity (BMI 30.0-34.9)

## 2025-05-12 ENCOUNTER — OFFICE VISIT (OUTPATIENT)
Dept: PRIMARY CARE | Facility: CLINIC | Age: 60
End: 2025-05-12
Payer: COMMERCIAL

## 2025-05-12 VITALS
DIASTOLIC BLOOD PRESSURE: 78 MMHG | BODY MASS INDEX: 34.21 KG/M2 | RESPIRATION RATE: 16 BRPM | HEART RATE: 77 BPM | OXYGEN SATURATION: 98 % | HEIGHT: 69 IN | SYSTOLIC BLOOD PRESSURE: 121 MMHG | WEIGHT: 231 LBS

## 2025-05-12 DIAGNOSIS — Z12.5 SCREENING PSA (PROSTATE SPECIFIC ANTIGEN): ICD-10-CM

## 2025-05-12 DIAGNOSIS — K58.9 IRRITABLE BOWEL SYNDROME, UNSPECIFIED TYPE: ICD-10-CM

## 2025-05-12 DIAGNOSIS — K21.9 GASTROESOPHAGEAL REFLUX DISEASE WITHOUT ESOPHAGITIS: ICD-10-CM

## 2025-05-12 DIAGNOSIS — E78.5 DYSLIPIDEMIA: ICD-10-CM

## 2025-05-12 DIAGNOSIS — R14.0 ABDOMINAL BLOATING: Primary | ICD-10-CM

## 2025-05-12 DIAGNOSIS — E66.811 OBESITY (BMI 30.0-34.9): ICD-10-CM

## 2025-05-12 PROCEDURE — 99203 OFFICE O/P NEW LOW 30 MIN: CPT | Performed by: PHYSICIAN ASSISTANT

## 2025-05-12 PROCEDURE — 3008F BODY MASS INDEX DOCD: CPT | Performed by: PHYSICIAN ASSISTANT

## 2025-05-12 PROCEDURE — 1036F TOBACCO NON-USER: CPT | Performed by: PHYSICIAN ASSISTANT

## 2025-05-12 ASSESSMENT — COLUMBIA-SUICIDE SEVERITY RATING SCALE - C-SSRS
2. HAVE YOU ACTUALLY HAD ANY THOUGHTS OF KILLING YOURSELF?: NO
6. HAVE YOU EVER DONE ANYTHING, STARTED TO DO ANYTHING, OR PREPARED TO DO ANYTHING TO END YOUR LIFE?: NO
1. IN THE PAST MONTH, HAVE YOU WISHED YOU WERE DEAD OR WISHED YOU COULD GO TO SLEEP AND NOT WAKE UP?: NO

## 2025-05-12 ASSESSMENT — PATIENT HEALTH QUESTIONNAIRE - PHQ9
SUM OF ALL RESPONSES TO PHQ9 QUESTIONS 1 AND 2: 0
1. LITTLE INTEREST OR PLEASURE IN DOING THINGS: NOT AT ALL
2. FEELING DOWN, DEPRESSED OR HOPELESS: NOT AT ALL

## 2025-05-12 ASSESSMENT — ENCOUNTER SYMPTOMS
OCCASIONAL FEELINGS OF UNSTEADINESS: 0
DEPRESSION: 0
LOSS OF SENSATION IN FEET: 0

## 2025-05-12 ASSESSMENT — PAIN SCALES - GENERAL: PAINLEVEL_OUTOF10: 5

## 2025-05-13 LAB
BASOPHILS # BLD AUTO: 54 CELLS/UL (ref 0–200)
BASOPHILS NFR BLD AUTO: 0.6 %
CHOLEST SERPL-MCNC: 196 MG/DL
CHOLEST/HDLC SERPL: 3.2 (CALC)
EOSINOPHIL # BLD AUTO: 225 CELLS/UL (ref 15–500)
EOSINOPHIL NFR BLD AUTO: 2.5 %
ERYTHROCYTE [DISTWIDTH] IN BLOOD BY AUTOMATED COUNT: 13.5 % (ref 11–15)
HCT VFR BLD AUTO: 45 % (ref 38.5–50)
HDLC SERPL-MCNC: 61 MG/DL
HGB BLD-MCNC: 14.6 G/DL (ref 13.2–17.1)
LDLC SERPL CALC-MCNC: 115 MG/DL (CALC)
LYMPHOCYTES # BLD AUTO: 1647 CELLS/UL (ref 850–3900)
LYMPHOCYTES NFR BLD AUTO: 18.3 %
MCH RBC QN AUTO: 29.3 PG (ref 27–33)
MCHC RBC AUTO-ENTMCNC: 32.4 G/DL (ref 32–36)
MCV RBC AUTO: 90.4 FL (ref 80–100)
MONOCYTES # BLD AUTO: 855 CELLS/UL (ref 200–950)
MONOCYTES NFR BLD AUTO: 9.5 %
NEUTROPHILS # BLD AUTO: 6219 CELLS/UL (ref 1500–7800)
NEUTROPHILS NFR BLD AUTO: 69.1 %
NONHDLC SERPL-MCNC: 135 MG/DL (CALC)
PLATELET # BLD AUTO: 238 THOUSAND/UL (ref 140–400)
PMV BLD REES-ECKER: 9.3 FL (ref 7.5–12.5)
PSA SERPL-MCNC: 0.48 NG/ML
RBC # BLD AUTO: 4.98 MILLION/UL (ref 4.2–5.8)
TRIGL SERPL-MCNC: 94 MG/DL
WBC # BLD AUTO: 9 THOUSAND/UL (ref 3.8–10.8)

## 2025-05-14 ENCOUNTER — OFFICE VISIT (OUTPATIENT)
Dept: GASTROENTEROLOGY | Facility: CLINIC | Age: 60
End: 2025-05-14
Payer: COMMERCIAL

## 2025-05-14 VITALS
SYSTOLIC BLOOD PRESSURE: 120 MMHG | TEMPERATURE: 97.7 F | DIASTOLIC BLOOD PRESSURE: 80 MMHG | HEART RATE: 74 BPM | WEIGHT: 229 LBS | BODY MASS INDEX: 33.82 KG/M2

## 2025-05-14 DIAGNOSIS — R14.2 BELCHING: ICD-10-CM

## 2025-05-14 DIAGNOSIS — R68.81 EARLY SATIETY: ICD-10-CM

## 2025-05-14 DIAGNOSIS — M62.08 DIASTASIS RECTI: ICD-10-CM

## 2025-05-14 DIAGNOSIS — R10.11 BILATERAL UPPER ABDOMINAL PAIN: ICD-10-CM

## 2025-05-14 DIAGNOSIS — R10.12 BILATERAL UPPER ABDOMINAL PAIN: ICD-10-CM

## 2025-05-14 DIAGNOSIS — R14.0 ABDOMINAL BLOATING: ICD-10-CM

## 2025-05-14 DIAGNOSIS — R14.0 ABDOMINAL DISTENSION: ICD-10-CM

## 2025-05-14 DIAGNOSIS — R12 HEARTBURN: ICD-10-CM

## 2025-05-14 DIAGNOSIS — R19.8 ABDOMINAL FULLNESS: ICD-10-CM

## 2025-05-14 DIAGNOSIS — R11.0 NAUSEA: Primary | ICD-10-CM

## 2025-05-14 DIAGNOSIS — K21.9 GASTROESOPHAGEAL REFLUX DISEASE WITHOUT ESOPHAGITIS: ICD-10-CM

## 2025-05-14 DIAGNOSIS — R14.3 FLATULENCE: ICD-10-CM

## 2025-05-14 PROCEDURE — 99215 OFFICE O/P EST HI 40 MIN: CPT | Performed by: NURSE PRACTITIONER

## 2025-05-14 PROCEDURE — 99205 OFFICE O/P NEW HI 60 MIN: CPT | Performed by: NURSE PRACTITIONER

## 2025-05-14 RX ORDER — NAPROXEN 250 MG/1
250 TABLET ORAL AS NEEDED
COMMUNITY

## 2025-05-14 ASSESSMENT — ENCOUNTER SYMPTOMS
DIAPHORESIS: 0
HALLUCINATIONS: 0
AGITATION: 0
SORE THROAT: 0
ADENOPATHY: 0
FREQUENCY: 0
DYSURIA: 0
LIGHT-HEADEDNESS: 0
WEAKNESS: 0
NUMBNESS: 0
DIZZINESS: 0
PHOTOPHOBIA: 0
FLANK PAIN: 0
SHORTNESS OF BREATH: 0
COUGH: 0
BACK PAIN: 0
FATIGUE: 0
WHEEZING: 0
NERVOUS/ANXIOUS: 0
CHILLS: 0
FEVER: 0
JOINT SWELLING: 0
PALPITATIONS: 0
HEADACHES: 0
EYE PAIN: 0
HEMATURIA: 0
ARTHRALGIAS: 0
MYALGIAS: 0

## 2025-05-14 NOTE — PROGRESS NOTES
"Subjective   Patient ID: Chandu Allred is a 60 y.o. male who presents for digestive issues per PCP referral.     This is a 60 year old WM with history of dyslipidemia who is presenting to the GI clinic for initial visit.     History per patient and review of EMR    Goes by \"Chandu\"    Patient is here as a same-day add-on appointment    He would like a second GI opinion of his GI symptoms.    He has many different GI complaints which he would like to discuss today.    Reports for 1-1.5 years he's been having bilateral upper abdominal fullness/pain, abdominal \"soreness\" under his rib cage, abdominal bloating, intermittent abdominal distension, belching, and flatulence. A 14 day Xifaxan course did not help the aforementioned.  Tried a dairy free diet which did not help.    ROS positive for early satiety and nausea.  He is asking about getting a gastric emptying study.    ROS positive for occasional floating stools.    Reports a bulge in his abdomen.    Moves bowels every day.     Reports taking esomeprazole on occasion for heartburn which helps.    Denies unintentional weight loss, regurgitation, vomiting, diarrhea, constipation, hematemesis, hematochezia, and melena.    He's never been on a gluten free diet.     Reports having an EGD at [facility] in 2020 which noted a gastric ulcer and potentially an esophageal ulcer (I cannot locate any record of this).    Previously following with Dr. Isa Solitario with  GI     Patient underwent EGD and screening colonoscopy on 2/23/24 with Dr. Isa Solitario. EGD noted nodular mucosa in the GE junction and erythematous mucosa in the gastric antrum.  Colonoscopy noted internal hemorrhoids, but was otherwise normal.   See pathology below.       A. Duodenum, bulb, second part, biopsy:   -- Duodenal mucosa with focal acute duodenitis      B. Stomach, antrum, biopsy:   -- Gastric mucosa with no significant pathologic findings   -- No evidence of Helicobacter organisms, intestinal " metaplasia or dysplasia      C. 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   HENT:  Negative for congestion, ear pain, hearing loss, sneezing and sore throat.    Eyes:  Negative for photophobia, pain and visual disturbance.   Respiratory:  Negative for cough, shortness of breath and wheezing.    Cardiovascular:  Negative for chest pain, palpitations and leg swelling.   Endocrine: Negative for cold intolerance and heat intolerance.   Genitourinary:  Negative for dysuria, flank pain, frequency and hematuria.   Musculoskeletal:  Negative for arthralgias, back pain, gait problem, joint swelling and myalgias.   Skin:  Negative for rash.   Neurological:  Negative for dizziness, syncope, weakness, light-headedness, numbness and headaches.   Hematological:  Negative for adenopathy.   Psychiatric/Behavioral:  Negative for agitation and hallucinations. The patient is not nervous/anxious.      Objective     Lab Results   Component Value Date    WBC 9.0 2025    HGB 14.6 2025    HCT 45.0 2025    MCV 90.4 2025     2025      Lab Results   Component Value Date    CREATININE 0.86 2025    BUN 15 2025     2025    K 4.5 2025     CL 99 03/25/2025    CO2 30 03/25/2025      Lab Results   Component Value Date    ALT 43 03/25/2025    AST 23 03/25/2025     (H) 02/06/2024    ALKPHOS 39 03/25/2025    BILITOT 0.6 03/25/2025     Lab Results   Component Value Date    TSH 1.30 11/06/2019        /80   Pulse 74   Temp 36.5 °C (97.7 °F) (Temporal)   Wt 104 kg (229 lb)   BMI 33.82 kg/m²     Physical Exam  Constitutional:       General: He is not in acute distress.     Appearance: He is obese.   HENT:      Head: Normocephalic and atraumatic.   Eyes:      Conjunctiva/sclera: Conjunctivae normal.   Cardiovascular:      Rate and Rhythm: Normal rate and regular rhythm.      Heart sounds: No murmur heard.     No gallop.   Pulmonary:      Effort: Pulmonary effort is normal.      Breath sounds: Normal breath sounds.   Abdominal:      General: Bowel sounds are normal. There is no distension.      Tenderness: There is no abdominal tenderness. There is no guarding.      Comments: Diastasis recti    Musculoskeletal:         General: No swelling or deformity. Normal range of motion.      Cervical back: Normal range of motion. No rigidity.   Skin:     General: Skin is warm and dry.      Coloration: Skin is not jaundiced.      Findings: No lesion or rash.   Neurological:      General: No focal deficit present.      Mental Status: He is alert and oriented to person, place, and time.   Psychiatric:         Mood and Affect: Mood normal.         Assessment/Plan   Problem List Items Addressed This Visit       Abdominal bloating    Relevant Orders    Pancreatic Elastase, Fecal    Breath Hydrogen Test-Bacterial Overgrowth    Gastroesophageal reflux disease without esophagitis     Other Visit Diagnoses         Nausea    -  Primary    Relevant Orders    NM gastric emptying solid      Early satiety        Relevant Orders    NM gastric emptying solid      Diastasis recti        Relevant Orders    Referral to Physical Therapy      Abdominal distension           "Abdominal fullness          Bilateral upper abdominal pain          Belching          Flatulence          Heartburn              Bilateral upper abdominal fullness/pain, abdominal \"soreness\" under rib cage, abdominal bloating, intermittent abdominal distension, belching, floating stools, flatulence, early satiety, nausea: Etiology not entirely clear. Potentially multifactorial. Somewhat vague symptomatology.  Consider gastroparesis, SIBO, and EPI.  I do question at least some functional component to his symptomatology.  - Obtain gastric emptying study  - Check fecal elastase  - Obtain hydrogen breath test to check for SIBO    2.  Diastasis recti: He may benefit from exercises in regards to this  - Refer to physical therapy    3.  Colorectal cancer screening:  - Screening colonoscopy due in 2034    4.  Heartburn: Controlled with occasional use of esomeprazole.    5.  Follow-up:  - Return to clinic 2 to 3 weeks after completion of the above testing      *I spent 59 minutes with this pt. Greater than 50% of this time was spent  in counseling and/or coordination of care.          [1] No Known Allergies  [2]   Current Outpatient Medications   Medication Sig Dispense Refill    esomeprazole (NexIUM) 40 mg DR capsule Take 1 capsule (40 mg) by mouth once daily in the morning. Take before meals. **DUE TO SEE DOCTOR IN MARCH BEFORE NEXT REFILL** (Patient taking differently: Take 1 capsule (40 mg) by mouth once daily in the morning. Take before meals. Takes on occasion) 90 capsule 0    naproxen (Naprosyn) 250 mg tablet Take 1 tablet (250 mg) by mouth if needed for mild pain (1 - 3).      ezetimibe (Zetia) 10 mg tablet Take 1 tablet (10 mg) by mouth once daily. (Patient not taking: Reported on 5/14/2025) 90 tablet 1     No current facility-administered medications for this visit.     "

## 2025-05-14 NOTE — PATIENT INSTRUCTIONS
Thanks for coming to the GI clinic.    Please call [phone] to schedule a gastric emptying study.    Please schedule a hydrogen breath test to check for small intestinal bacterial overgrowth.    Please obtain a stool test to check for exocrine pancreatic insufficiency.    Please call [phone] to schedule a physical therapy appointment for diastasis recti. You may benefit from exercises for this.     You will be due for a screening colonoscopy in 2034.    Follow-up with me in clinic 2 to 3 weeks after completion of the above testing.

## 2025-05-29 ENCOUNTER — APPOINTMENT (OUTPATIENT)
Dept: PRIMARY CARE | Facility: CLINIC | Age: 60
End: 2025-05-29
Payer: COMMERCIAL